# Patient Record
Sex: MALE
[De-identification: names, ages, dates, MRNs, and addresses within clinical notes are randomized per-mention and may not be internally consistent; named-entity substitution may affect disease eponyms.]

---

## 2019-09-25 PROBLEM — Z00.00 ENCOUNTER FOR PREVENTIVE HEALTH EXAMINATION: Status: ACTIVE | Noted: 2019-09-25

## 2019-09-30 ENCOUNTER — APPOINTMENT (OUTPATIENT)
Dept: SURGERY | Facility: CLINIC | Age: 29
End: 2019-09-30
Payer: MEDICAID

## 2019-09-30 VITALS
OXYGEN SATURATION: 99 % | BODY MASS INDEX: 24.9 KG/M2 | HEIGHT: 74 IN | HEART RATE: 102 BPM | SYSTOLIC BLOOD PRESSURE: 135 MMHG | TEMPERATURE: 97.7 F | DIASTOLIC BLOOD PRESSURE: 90 MMHG | WEIGHT: 194.06 LBS

## 2019-09-30 DIAGNOSIS — L72.3 SEBACEOUS CYST: ICD-10-CM

## 2019-09-30 PROCEDURE — 99242 OFF/OP CONSLTJ NEW/EST SF 20: CPT

## 2019-09-30 NOTE — HISTORY OF PRESENT ILLNESS
[de-identified] : 27 yo male who presents to the office for evaluation of back mass. Pt has been on antibiotics before for this but never had any intervention on this.

## 2019-09-30 NOTE — PHYSICAL EXAM
[JVD] : no jugular venous distention  [Normal Thyroid] : the thyroid was normal [Normal Breath Sounds] : Normal breath sounds [Normal Heart Sounds] : normal heart sounds [Normal Rate and Rhythm] : normal rate and rhythm [Abdomen Tenderness] : ~T ~M No abdominal tenderness [Abdominal Masses] : No abdominal masses [No HSM] : no hepatosplenomegaly [Tender] : was nontender [Enlarged] : not enlarged [Alert] : alert [Oriented to Place] : oriented to place [Oriented to Person] : oriented to person [Oriented to Time] : oriented to time [Calm] : calm [de-identified] : normal [de-identified] : alert and oriented x 3 [de-identified] : mobile 3m back mass

## 2019-09-30 NOTE — ASSESSMENT
[FreeTextEntry1] : 27 yo male with back mass\par Plan: Excision of back mass\par -All risk, benefit, alternatives explained and the family expressed full understanding

## 2019-09-30 NOTE — CONSULT LETTER
[Consult Letter:] : I had the pleasure of evaluating your patient, [unfilled]. [Dear  ___] : Dear  [unfilled], [Please see my note below.] : Please see my note below. [FreeTextEntry3] : Sincerely, \par \par \par Jarred Martinez MD, FACS\par \par  of Surgery\par [facility]\par System Chief, Residency Program\par [facility] \par \par [employer] [facility]\par Co-Director of [facility] Clerkship\par [employer] [facility]\par  [Consult Closing:] : Thank you very much for allowing me to participate in the care of this patient.  If you have any questions, please do not hesitate to contact me.

## 2019-11-19 ENCOUNTER — APPOINTMENT (OUTPATIENT)
Dept: SURGERY | Facility: CLINIC | Age: 29
End: 2019-11-19
Payer: MEDICAID

## 2019-11-19 ENCOUNTER — LABORATORY RESULT (OUTPATIENT)
Age: 29
End: 2019-11-19

## 2019-11-19 PROCEDURE — 11404 EXC TR-EXT B9+MARG 3.1-4 CM: CPT

## 2019-11-19 NOTE — PROCEDURE
[FreeTextEntry1] : After consent was obtained. Patient was brought to the procedure room, place in the prone position on the examination table. The back was prepped and draped in the normal fashion. A time out procedure was performed where each member of the procedure team introduced him or herself, we identified the patient by name, date of birth and MR number. We confirmed the nature of the operation and all supplies were present. After the time out procedure was completed the operation commenced. \par \par 0.5% Marcaine (5cc) without epinephrine was used to infiltrate the subcutaneous tissues and skin surrounding the lesion. An elliptical incision was made surrounding the lesion. We dissected down through the subcutaneous tissues with  electrocautery achieving hemostasis. The lesion was dissected from surrounding capsule and passed off the field for permanent pathology. The wound bed was hemostatic. The subcutaneous tissues were approximated with 2-0 Vicryl and the skin was approximated by 4-0 Monocryl. Dressings were applied. Patient tolerated the procedure well.

## 2019-11-25 ENCOUNTER — APPOINTMENT (OUTPATIENT)
Dept: SURGERY | Facility: CLINIC | Age: 29
End: 2019-11-25
Payer: MEDICAID

## 2019-11-25 VITALS
WEIGHT: 194 LBS | SYSTOLIC BLOOD PRESSURE: 155 MMHG | BODY MASS INDEX: 24.9 KG/M2 | HEIGHT: 74 IN | OXYGEN SATURATION: 98 % | DIASTOLIC BLOOD PRESSURE: 88 MMHG | TEMPERATURE: 98.1 F | HEART RATE: 114 BPM

## 2019-11-25 PROCEDURE — 99024 POSTOP FOLLOW-UP VISIT: CPT

## 2019-11-25 NOTE — HISTORY OF PRESENT ILLNESS
[de-identified] : Pt seen and examined. Pt is s/p excision back mass. Wound edges are healing well.  Return to office as needed.